# Patient Record
Sex: MALE | Race: BLACK OR AFRICAN AMERICAN | NOT HISPANIC OR LATINO | Employment: STUDENT | URBAN - METROPOLITAN AREA
[De-identification: names, ages, dates, MRNs, and addresses within clinical notes are randomized per-mention and may not be internally consistent; named-entity substitution may affect disease eponyms.]

---

## 2023-05-06 ENCOUNTER — OFFICE VISIT (OUTPATIENT)
Dept: FAMILY MEDICINE | Facility: CLINIC | Age: 28
End: 2023-05-06

## 2023-05-06 VITALS
TEMPERATURE: 98.3 F | DIASTOLIC BLOOD PRESSURE: 73 MMHG | RESPIRATION RATE: 12 BRPM | OXYGEN SATURATION: 99 % | HEART RATE: 74 BPM | SYSTOLIC BLOOD PRESSURE: 119 MMHG

## 2023-05-06 DIAGNOSIS — H57.89 IRRITATION OF LEFT EYE: Primary | ICD-10-CM

## 2023-05-06 DIAGNOSIS — Z97.3 WEARS CONTACT LENSES: ICD-10-CM

## 2023-05-06 PROCEDURE — 99203 OFFICE O/P NEW LOW 30 MIN: CPT | Performed by: PHYSICIAN ASSISTANT

## 2023-05-06 NOTE — PROGRESS NOTES
Patient presents with:  Eye Problem: Left Eye Discomfort      Clinical Decision Making:  Eyes were inspected as described in the procedure below.  No stye or meibomian cyst and no foreign body was seen.  Patient will be treated with over-the-counter wetting drops with Systane.  Monitor symptoms for improvement. Expected course of resolution and indication for return was gone over and questions were answered to patient/parent's satisfaction before discharge.    Note for work was written for today's office appointment.      ICD-10-CM    1. Irritation of left eye  H57.89       2. Wears contact lenses  Z97.3           Patient Instructions   Use over-the-counter wetting drops such as Systane    Follow-up with primary care provider if not getting good resolution or if new symptoms or concerns arise.          HPI:  Jovi Campos is a 28 year old male who presents today for evaluation of left eye irritation.  Patient is a contact lens wearer.  He discontinued his contact lenses today is wearing glasses.  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  No reported foreign body into the left eye.  Rather patient feels that there was slight irritation no photosensitivity, tearing, extraocular muscle movement pain.  No reported vision changes.    History obtained from chart review and the patient.    Problem List:  There are no relevant problems documented for this patient.      No past medical history on file.    Social History     Tobacco Use     Smoking status: Not on file     Smokeless tobacco: Not on file   Substance Use Topics     Alcohol use: Not on file       Review of Systems  As above in HPI otherwise negative.    Vitals:    05/06/23 1224   BP: 119/73   Pulse: 74   Resp: 12   Temp: 98.3  F (36.8  C)   TempSrc: Oral   SpO2: 99%       General: Patient is resting comfortably no acute distress is afebrile  HEENT: Head is normocephalic  atraumatic   eyes are PERRL EOMI sclera anicteric   There is both direct and consensual stimulation to light no pain with movement of the eyes and extraocular muscles are intact and not painful.  Proparacaine drops were placed into the left eye for anesthesia.  Next fluorescein stain was performed.  There is no corneal abrasion or uptake noted.  Next the upper eyelid was everted and inspected there is no foreign body or irritation or cyst noted on the eyelid.  Next my attention was turned to the left lower eyelid again in a similar fashion there was no debris or irritation noted.  The eye was flushed with sterile water patient tolerated entire procedure very well.  TMs are clear bilaterally  Throat is clear  No cervical lymphadenopathy present      Physical Exam    At the end of the encounter, I discussed results, diagnosis, medications. Discussed red flags for immediate return to clinic/ER, as well as indications for follow up if no improvement. Patient understood and agreed to plan. Patient was stable for discharge.

## 2023-05-06 NOTE — PATIENT INSTRUCTIONS
Use over-the-counter wetting drops such as Systane    Follow-up with primary care provider if not getting good resolution or if new symptoms or concerns arise.

## 2023-05-06 NOTE — LETTER
[address]  [facility] [address]  Phone: [phone]  Fax: [phone]    May 6, 2023        Jovi Campos  [address] [place] [id number]          To whom it may concern:    RE: Jovi Campos    Patient was seen and treated today at our clinic and missed work.    Please contact me for questions or concerns.      Sincerely,        Braulio Wu PA-C